# Patient Record
Sex: MALE | Race: WHITE | NOT HISPANIC OR LATINO | URBAN - METROPOLITAN AREA
[De-identification: names, ages, dates, MRNs, and addresses within clinical notes are randomized per-mention and may not be internally consistent; named-entity substitution may affect disease eponyms.]

---

## 2023-06-16 ENCOUNTER — HOSPITAL ENCOUNTER (OUTPATIENT)
Dept: DATA CONVERSION | Facility: HOSPITAL | Age: 43
End: 2023-06-16
Attending: ORTHOPAEDIC SURGERY | Admitting: ORTHOPAEDIC SURGERY

## 2023-06-16 DIAGNOSIS — M20.012 MALLET FINGER OF LEFT FINGER(S): ICD-10-CM

## 2023-06-16 LAB
ATRIAL RATE: 61 BPM
P AXIS: 68 DEGREES
P OFFSET: 199 MS
P ONSET: 140 MS
PR INTERVAL: 160 MS
Q ONSET: 220 MS
QRS COUNT: 10 BEATS
QRS DURATION: 86 MS
QT INTERVAL: 402 MS
QTC CALCULATION(BAZETT): 404 MS
QTC FREDERICIA: 404 MS
R AXIS: 75 DEGREES
T AXIS: 64 DEGREES
T OFFSET: 421 MS
VENTRICULAR RATE: 61 BPM

## 2023-09-07 VITALS — WEIGHT: 155.87 LBS | HEIGHT: 71 IN | BODY MASS INDEX: 21.82 KG/M2

## 2023-09-30 NOTE — H&P
History & Physical Reviewed:   I have reviewed the History and Physical dated:  12-Jun-2023   History and Physical reviewed and relevant findings noted. Patient examined to review pertinent physical  findings.: No significant changes   Home Medications Reviewed: no changes noted   Allergies Reviewed: no changes noted     Consent:   COVID-19 Consent:  ·  COVID-19 Risk Consent Surgeon has reviewed key risks related to the risk of phong COVID-19 and if they contract COVID-19 what the risks are.       Electronic Signatures:  Mahendra Bacon ()  (Signed 16-Jun-2023 07:56)   Authored: History & Physical Reviewed, Consent, Note  Completion      Last Updated: 16-Jun-2023 07:56 by Mahendra Bacon ()

## 2023-10-02 NOTE — OP NOTE
Post Operative Note:     Post-Procedure Diagnosis: Left ring finger soft tissue  mallet injury   Procedure: 1.   2.   3.   4.   5.   Surgeon: Mahendra Bacon D.O.   Resident/Fellow/Other Assistant: BELA Correa   Estimated Blood Loss (mL): Less than 10 cc   Specimen: no   Findings: Left ring finger soft tissue mallet injury     Operative Report Dictated:  Dictation: not applicable - note contains Operative  Report   Operative Report:    Preoperative diagnosis:Left ring finger soft tissue mallet injury    Postoperative diagnosis:Left ring finger soft tissue mallet injury    Procedure planned:Closed pinning left ring finger soft tissue mallet injury    Procedure performed:Closed pinning left ring finger soft tissue mallet injury    Surgeon:Mahendra Bacon D.O.    Assistant:None    Anesthesia:Digital block monitored by the anesthesia team    Estimated blood loss:Less than 10 cc    Drains:None    Tourniquet:Turnicot for approximately 10 minutes    Specimens:None    Implants:K wire    Indications for procedure:The patient sustained injury to the left ring finger while catching a football.  He had pain about the distal interphalangeal joint and showed inability to actively extend the distal interphalangeal joint.  X-ray showed no  acute fracture or dislocation.  Injury pattern was consistent with soft tissue mallet injury with detachment of the terminal extensor mechanism at the dorsal base of P3.  Treatment options were discussed.  We talked about operative and nonoperative strategies.   The patient elected to proceed forth with surgery by way of close pinning of the distal interphalangeal joint and extension.  Informed consent was signed and placed in the chart.    Complications: None noted at the time of surgery    Description of procedure:The patient was taken to the operative suite.  He was carefully positioned on the table in such a fashion as to pad all bony prominences and peripheral nerves.  He was  administered appropriate IV antibiotics.  The left ring  finger was confirmed to be the operative site.  He was prepped and draped in the normal sterile fashion.  A turnicot was placed at the base of the digit.  Fluoroscopic imaging was brought in.  The left ring finger distal interphalangeal joint was placed  into slight hyperextension.  A 4 5 K wire was then started at the tip of the distal phalanx and delivered in a retrograde fashion traversing the distal phalanx, the distal interphalangeal joint, and anchored bicortically into the middle phalanx.  The  pin was cut in a subcuticular position.  The turnicot was relieved.  Soft dressing and splint was placed.  The patient was allowed to head to recovery in stable condition.  Overall the patient tolerated the procedure well.    Disposition: Stable to PACU      Electronic Signatures:  Mahendra Bacon)  (Signed 16-Jun-2023 10:30)   Authored: Post Operative Note, Note Completion      Last Updated: 16-Jun-2023 10:30 by Mahendra Bacon ()